# Patient Record
Sex: FEMALE | Race: WHITE
[De-identification: names, ages, dates, MRNs, and addresses within clinical notes are randomized per-mention and may not be internally consistent; named-entity substitution may affect disease eponyms.]

---

## 2017-01-11 ENCOUNTER — HOSPITAL ENCOUNTER (OUTPATIENT)
Dept: HOSPITAL 80 - FIMAGING | Age: 52
End: 2017-01-11
Payer: COMMERCIAL

## 2017-01-11 DIAGNOSIS — N63: Primary | ICD-10-CM

## 2017-01-11 PROCEDURE — 0HBU3ZX EXCISION OF LEFT BREAST, PERCUTANEOUS APPROACH, DIAGNOSTIC: ICD-10-PCS | Performed by: RADIOLOGY

## 2017-01-11 PROCEDURE — G0206 DX MAMMO INCL CAD UNI: HCPCS

## 2017-01-11 NOTE — US
Ultrasound-Guided Vacuum-Assisted Core Biopsy of the Left Breast     January 11, 2017

 

Indication:  1.2-cm hypoechoic nodule lower left breast 6 o'clock position.

 

Crosscutting Measure #226:  Current tobacco user:  No. 

 

Consent:  The procedure, risks, and benefits were discussed with the patient.  She agreed to proceed 
and signed the consent form.

 

Technique:  The 1.2-cm nodule at the 6 o'clock position was localized with a preprocedure ultrasound.
  The skin was marked, cleaned with ChloraPrep, and sterilely draped.  The skin and soft tissues were
 anesthetized with 1% lidocaine and bupivacaine.  Through a 2-mm skin nick, a 12-gauge Suros needle w
as advanced along the undersurface of the lesion.  Ten vacuum-assisted core biopsy samples were obtai
gabriela.  A visible nodule persisted at the end of the procedure.  A SecurMark clip was deployed, the nee
dle was removed, and pressure was applied to the biopsy site for 10 minutes.  No significant bleeding
 or complication was incurred.

 

Impression:  Successful ultrasound-guided core biopsy of nodule lower left breast 6 o'clock position.


 

Plan:  Postbiopsy mammograms.

## 2017-01-11 NOTE — MA
Left Diagnostic Mammogram  

 

Indication: Post biopsy clip deployment.

 

Technique: True lateral and CC views of the left breast.

 

Comparison: Left breast ultrasound, December 21, 2016, and tomosynthesis views, September 2016.

 

Findings: The clip is deployed at the 6 o'clock position just inferior to the nipple. No hematoma or 
discrete mass. Injected lidocaine obscures the soft tissue planes.

 

Impression: Good clip deployment at the 6 o'clock position.

 

Plan: Pending histologic results.

## 2017-01-30 ENCOUNTER — HOSPITAL ENCOUNTER (OUTPATIENT)
Dept: HOSPITAL 80 - FIMAGING | Age: 52
End: 2017-01-30
Attending: INTERNAL MEDICINE
Payer: COMMERCIAL

## 2017-01-30 DIAGNOSIS — C50.312: Primary | ICD-10-CM

## 2017-01-30 PROCEDURE — 0159T: CPT

## 2017-01-30 PROCEDURE — C8908 MRI W/O FOL W/CONT, BREAST,: HCPCS

## 2017-01-30 PROCEDURE — 77059: CPT

## 2017-01-30 PROCEDURE — A9585 GADOBUTROL INJECTION: HCPCS

## 2017-01-30 NOTE — MR
MRI Breasts Bilateral Without and With Contrast



History: Recently diagnosed left breast cancer with ultrasound biopsy. Staging.



Comparison: Ultrasound January 11, 2017, December 21, 2016, and mammography September 2016.



Technique:  Precontrast sagittal fat-saturated T2-weighted and Vibrant images of both breasts are obt
ained.  Subsequently, during intravenous administration of 5 mL Gadavist, multiphasic sagittally acqu
ired Vibrant MR images through both breasts are obtained.  In addition, high resolution axial images 
were obtained in the axial plane pre- and post contrast.  Data is sent to [facility] for additional anal
ysis including 3D reconstruction, computer-aided detection (CAD), and color-coded phase contrast enha
ncement evaluation.



Findings: There is a spiculated mass in the inferior left breast in the 6 o'clock position measuring 
14 x 11 mm. There are mixed kinetics within this lesion. An adjacent small satellite nodule is seen m
easuring 4 mm which is 5 mm from the spiculated mass. Mild underlying fibrocystic enhancement in the 
left breast. Lymph node is seen in the left axilla measuring 18 mm with thickened cortex. No suspicio
us internal mammary lymph nodes.



Right breast: There is mild fibrocystic enhancement in the right breast. No evidence for mass or abno
rmal enhancement in the right breast to indicate contralateral breast carcinoma. No suspicious axilla
ry or internal mammary lymph nodes.



Impression: 

1. 14 mm spiculated mass inferior left breast in 6 o'clock position corresponding with the infiltrati
ng ductal carcinoma from recent ultrasound-guided biopsy. Small adjacent satellite nodule. Mildly fred
picious left axillary lymph node.

2. No evidence for contralateral breast carcinoma right breast.



BI-RADS: 6

## 2017-02-03 ENCOUNTER — HOSPITAL ENCOUNTER (OUTPATIENT)
Dept: HOSPITAL 80 - FSGY | Age: 52
Setting detail: OBSERVATION
LOS: 1 days | Discharge: HOME | End: 2017-02-04
Attending: SURGERY | Admitting: SURGERY
Payer: COMMERCIAL

## 2017-02-03 VITALS — RESPIRATION RATE: 16 BRPM

## 2017-02-03 DIAGNOSIS — C50.512: Primary | ICD-10-CM

## 2017-02-03 DIAGNOSIS — C77.9: ICD-10-CM

## 2017-02-03 DIAGNOSIS — Z17.0: ICD-10-CM

## 2017-02-03 PROCEDURE — 07B60ZX EXCISION OF LEFT AXILLARY LYMPHATIC, OPEN APPROACH, DIAGNOSTIC: ICD-10-PCS | Performed by: SURGERY

## 2017-02-03 PROCEDURE — C9728 PLACE DEVICE/MARKER, NON PRO: HCPCS

## 2017-02-03 PROCEDURE — 76098 X-RAY EXAM SURGICAL SPECIMEN: CPT

## 2017-02-03 PROCEDURE — A9520 TC99 TILMANOCEPT DIAG 0.5MCI: HCPCS

## 2017-02-03 PROCEDURE — 0HBU0ZZ EXCISION OF LEFT BREAST, OPEN APPROACH: ICD-10-PCS | Performed by: SURGERY

## 2017-02-03 PROCEDURE — 0HX5XZZ TRANSFER CHEST SKIN, EXTERNAL APPROACH: ICD-10-PCS | Performed by: SURGERY

## 2017-02-03 PROCEDURE — 0HHU01Z INSERTION OF RADIOACTIVE ELEMENT INTO LEFT BREAST, OPEN APPROACH: ICD-10-PCS | Performed by: SURGERY

## 2017-02-03 PROCEDURE — 14000 TIS TRNFR TRUNK 10 SQ CM/<: CPT

## 2017-02-03 PROCEDURE — G0378 HOSPITAL OBSERVATION PER HR: HCPCS

## 2017-02-03 PROCEDURE — 19285 PERQ DEV BREAST 1ST US IMAG: CPT

## 2017-02-03 PROCEDURE — 19302 P-MASTECTOMY W/LN REMOVAL: CPT

## 2017-02-03 PROCEDURE — 3E0W3HZ INTRODUCTION OF RADIOACTIVE SUBSTANCE INTO LYMPHATICS, PERCUTANEOUS APPROACH: ICD-10-PCS | Performed by: SURGERY

## 2017-02-03 PROCEDURE — 78195 LYMPH SYSTEM IMAGING: CPT

## 2017-02-03 RX ADMIN — HYDROCODONE BITARTRATE AND ACETAMINOPHEN PRN TAB: 5; 325 TABLET ORAL at 23:10

## 2017-02-03 RX ADMIN — HYDROCODONE BITARTRATE AND ACETAMINOPHEN PRN TAB: 5; 325 TABLET ORAL at 17:42

## 2017-02-03 NOTE — GOP
[f 
rep st]



                                                                OPERATIVE REPORT





DATE OF OPERATION:  02/03/2017



SURGEON:  Ryder Rondon MD, FACS



ASSISTANT:  GLORIA Burris



ANESTHESIA:  General by laryngeal mask, Mervin Wood MD.



PREOPERATIVE DIAGNOSIS:  Left breast carcinoma.



POSTOPERATIVE DIAGNOSIS:  Left breast carcinoma.



PROCEDURE PERFORMED:  

1.  Left partial mastectomy with preoperative ultrasound-guided needle 
localization, left axillary sentinel lymph node mapping and biopsy.

2.  Left axillary lymph node dissection.

3.  Left breast local tissue flap reconstruction.

4.  Left breast implantation of absorbable marker (BioZorb. [facility] code ).



FINDINGS:  Two sentinel lymph nodes submitted for frozen section.  One of the 2 
showing evidence of metastatic malignancy.  Subsequent level 1 axillary lymph 
node dissection submitted for permanent section.  Left partial mastectomy 
specimen inked with a margin marker kit and submitted to imaging for specimen 
mammogram, confirming the tumor and previously deployed Suros clip within the 
specimen.  Subsequently submitted to Pathology for permanent section.



ESTIMATED BLOOD LOSS:  50 mL.



DESCRIPTION OF PROCEDURE:  After informed consent was obtained, the patient was 
brought to the operating room, placed under general anesthesia.  She had 
undergone preoperative sentinel lymph node injection and lymphoscintigraphy, 
revealing a left axillary node as the primary point of drainage from the left 
breast.  She also underwent ultrasound-guided needle localization of the 
previously biopsied tumor in the lower central left breast.  Before proceeding, 
a time-out and identification of the patient was performed. 



The left breast and chest wall were prepped and draped in the usual fashion.  
The Neoprobe gamma detector was used to identify the sentinel node in the axilla
, and the skin marked on the surface with a marking pen.  The skin was 
infiltrated with 0.25% Marcaine plain, incised transversely, and dissection 
carried out through the skin and subcutaneous tissues, and superficial axillary 
fascia. The deep axillary tissues were dissected and 3 nodes were removed.  Two 
of these were sentinel nodes with ex vivo counts of greater than 1000.  The 3rd 
lymph node was a non sentinel lymph node and was set aside for permanent 
section.  These were submitted for frozen section.  One of them felt somewhat 
firm, though neither 1 was particularly enlarged. 



While we were waiting for the results of the sentinel node biopsy, a partial 
mastectomy was performed as follows.  The patient's MRI was brought up on 
imaging in the operating room and studied, with the wire placed firmly through 
the center of the mass on ultrasound.  A circumareolar incision was planned.  
The area was infiltrated with 0.25% Marcaine, incised between approximately the 
7 o'clock and 4 o'clock position of the left areola, and dissection carried out 
through skin and subcutaneous tissues.  The wire which entered the breast in 
the lower outer quadrant was directed cephalad and medially, was intercepted 
into the incision.  The breast tissue around the shaft and tip of the wire was 
widely excised, and the specimen removed from the field.  Hemostasis was 
secured with cautery and 3-0 Monocryl suture ligature.  The specimen was 
immediately inked with a margin marker kit, and submitted for specimen mammogram
, and subsequent permanent section.  The cavity was reconstructed using 
oncoplastic techniques by freeing up adjacent tissue in 3 different orientations
, cephalad, medially, and laterally.  There was very little tissue inferiorly.  
The inframammary fold was released to allow attachment of the BioZorb device to 
the lower edge of the cavity.  The breast tissue deep to the subareolar plane 
was incised with cautery approximately 1.5 to 2 cm deep to the surface, and a 
cantilever of tissue created extending to the supraareolar region.  This was 
extended medially as well as laterally into the subcutaneous fatty tissues.  
The cavity was sized and a 2 x 3 x 1 cm low-profile BioZorb device was brought 
onto the field, and this was sutured to the surrounding breast tissue and the 
inframammary fold, bringing the cantilever of cephalad tissue over the top of 
the device, to provide tissue coverage.  This resulted in an acceptable contour 
of the breast, with no significant defect noted from the surface.  The 
remainder of the subcutaneous tissues were closed with 3-0 Monocryl suture.  
Subcutaneous tissues were ultimately closed with 4-0 Monocryl suture in a 
subcuticular fashion.  



Subsequently the sentinel nodes were evaluated by Pathology and 1 of the 2 
nodes was positive by frozen section.  We proceeded with axillary lymph node 
dissection as we had discussed with the patient preoperatively.  The incision 
was extended approximately 1 cm posteriorly, and this allowed dissection of the 
axillary contents inferiorly from the axillary vein, preserving the 
thoracodorsal neurovascular bundle as well as long thoracic nerve of Capps.  
There were no grossly suspicious nodes within the specimen, and as it was 
detached from the tail of the breast, hemostasis was secured with cautery and 
hemoclips.  Specimen was removed from the field and submitted for permanent 
section.  



The cavity was irrigated and aspirated, and hemostasis appeared secure.  A 10 
mm flat Markell-Lantigua drain was brought through a separate stab wound, cut to 
length, and positioned into the axillary cavity.  The subcutaneous tissues were 
closed with 3-0 Monocryl suture.  Skin was closed with 4-0 Monocryl suture in a 
subcuticular fashion.  Mastisol and Steri-Strips were applied.  Needle, sponge, 
and instrument counts were correct.



COMPLICATIONS:  None.





Job #:  [id number]

MTDD

## 2017-02-03 NOTE — POSTOPPROG
Post Op Note


Date of Operation: 02/03/17


Surgeon: Ryder Rondon (MD, FACS)


Assistant: Louise Tavera RN-FA


Anesthesiologist: Mervin Wood MD


Anesthesia: LMA


Pre-op Diagnosis: left breast cancer


Post-op Diagnosis: same


Procedure: left partial mastectomy/ALND + sentinel node mapping + oncoplastic 

recon


Inf/Abcess present in the surg proc area at time of surgery?: No


EBL: 


Drains: Markell Lantigua


Specimen(s): 


left partial mastectomy


sentinel nodes x 2 (1/2 + on frozen section)


one non-sentinel node


level I ALND

## 2017-02-03 NOTE — SOAPPROG
Downtime Inpatient MD


Late Entry SOAP Note: 


Due to computer downtime, I am recreating the following medical record entry as 

of this date and time based on the information specified below: 





Information on which this medical record entry is based: 





(MD: Please list items, such as nursing notes, labs, imaging, etcetera)

## 2017-02-03 NOTE — NM
Left Breast Lymphoscintigraphy/[place] Node



History: Right breast carcinoma. Preop for elective left mastectomy.



Comparison Studies: Previous mammograms



Witnessed Consent:  Witnessed informed consent was obtained after the risks, benefits, and alternativ
es of lymphoscintigraphy/sentinel node were explained to the patient and all questions were answered.




Technique: Left breast was prepped with ChloraPrep solution. Utilizing sterile technique, a total of 
343 microcuries of technetium 99m Lymphoseek was injected with single periareolar subcutaneous inject
ion and a second retroareolar injection 245 microcuries of technetium 99m Lymphoseek. Patient tolerat
ed the procedure well without immediate complications.



Imaging: [place] node was localized in the anterior and lateral projections. Left axillary sentinel 
node identified.



Impression: Left breast sentinel node localization with Lymphoseek.

 



Crosscutting Measure #226: Current tobacco user:  no.

## 2017-02-03 NOTE — US
Ultrasound-guided left Breast Hookwire Needle Localization



History: Left-sided breast cancer.



Crosscutting Measure #226 : Current tobacco user  no.



Witnessed Consent: Witnessed informed consent was obtained after the risks, benefits, and alternative
s of ultrasound guided left breast hookwire needle localization were explained to the patient and all
 questions were answered.



Procedure: Utilizing sterile technique and mammographic guidance, the left breast mass was identified
 for hookwire needle localization. Skin was prepped with ChloraPrep solution. Lidocaine with bicarbon
ate were used as local anesthesia. Via a inferolateral approach, a 5 cm hookwire needle was advanced 
through the mass. Confirmation of good positioning of the hook wire was obtained with ultrasound. The
 wire was deployed. Sterile dressing was placed. Patient tolerated the procedure well without immedia
te complications. The patient underwent subsequent lymphoscintigraphy procedure. 



Impression:  Successful ultrasound-guided hookwire needle localization of left breast mass.

## 2017-02-03 NOTE — SOAPPROG
Downtime Inpatient MD


Late Entry SOAP Note: 


post op visit


Marcelle is awake and alert/visiting with her family


Her surgical site appears uncomplicated


DOTTIE is sanguinous with minimal output





We discussed findings at surgery and post op recovery


I anticipate she will be able to discharge in the morning.





JAMIL Rondon MD, FACS

## 2017-02-03 NOTE — MA
Specimen radiograph 1107  hours.



History: Mass left breast.



Findings: The specimen radiograph contains the needle as well as the mass and clip that were localize
d earlier today. These results were called to the OR.



Impression: The mass and clip are present within the specimen radiograph submitted.

## 2017-02-04 VITALS — TEMPERATURE: 98.6 F | HEART RATE: 82 BPM | DIASTOLIC BLOOD PRESSURE: 51 MMHG | SYSTOLIC BLOOD PRESSURE: 95 MMHG

## 2017-02-04 VITALS — OXYGEN SATURATION: 95 %

## 2017-02-04 NOTE — PDDCSUM
Discharge Summary


Discharge Summary: 


DOA: 2/3/17


DOD: 2/4/17





DC Dx: Left breast cancer, stage II





Procedures: 2/3/17 Left partial mastectomy/ALND





Course: Marcelle was admitted for left partial mastectomy/sentinel lymph node 

mapping and biopsy.  She was found to have one of two sentinel nodes positive


during surgery and underwent completion ALND.  She was admitted for post op 

pain control and observation.  On the morning after surgery she was 

hemodynamically stable and her surgical site appeared uncomplicated.  She was 

advanced in her diet and instructed in wound/drain care. She will follow up in 

my office this coming week and I will call her with pathology results when they 

are available





DC meds:


Norco 5/325 #30


Ativan 1mg #20





FU with Dr. Chavez after pathology results available to discuss options for 

adjuvant therapy.

## 2017-09-15 ENCOUNTER — HOSPITAL ENCOUNTER (OUTPATIENT)
Dept: HOSPITAL 80 - FIMAGING | Age: 52
End: 2017-09-15
Attending: NURSE PRACTITIONER
Payer: COMMERCIAL

## 2017-09-15 DIAGNOSIS — M81.0: ICD-10-CM

## 2017-09-15 DIAGNOSIS — Z13.820: Primary | ICD-10-CM

## 2018-01-11 ENCOUNTER — HOSPITAL ENCOUNTER (OUTPATIENT)
Dept: HOSPITAL 80 - FIMAGING | Age: 53
End: 2018-01-11
Attending: INTERNAL MEDICINE
Payer: COMMERCIAL

## 2018-01-11 DIAGNOSIS — Z12.31: Primary | ICD-10-CM

## 2018-02-20 ENCOUNTER — HOSPITAL ENCOUNTER (EMERGENCY)
Dept: HOSPITAL 80 - FED | Age: 53
Discharge: HOME | End: 2018-02-20
Payer: COMMERCIAL

## 2018-02-20 VITALS — TEMPERATURE: 97.7 F | HEART RATE: 72 BPM

## 2018-02-20 VITALS
SYSTOLIC BLOOD PRESSURE: 108 MMHG | OXYGEN SATURATION: 93 % | DIASTOLIC BLOOD PRESSURE: 79 MMHG | RESPIRATION RATE: 16 BRPM
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
## 2018-02-20 DIAGNOSIS — S42.202A: Primary | ICD-10-CM
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
## 2018-02-20 NOTE — EDPHY
H & P


Time Seen by Provider: 02/20/18 17:13


HPI/ROS: 





CHIEF COMPLAINT:  Left humerus pain





HISTORY OF PRESENT ILLNESS:  The patient is a 52-year-old female who was skiing 

and fell sideways landing on her left arm.  Her arm was not outstretched.  She 

has pain to her left humerus area.  No shoulder or clavicle pain.  No elbow or 

hand pain.  Normal sensation and movement distally.  She denies head neck or 

back pain.  She denies loss of consciousness or headache.  This happened about 

an hour ago.








REVIEW OF SYSTEMS:


Constitutional:  denies: chills, fever, recent illness, recent injury


EENTM: denies: blurred vision, double vision, nose congestion


Respiratory: denies: cough, shortness of breath


Cardiac: denies: chest pain, irregular heart rate, lightheadedness, palpitations


Gastrointestinal/Abdominal: denies: abdominal pain, diarrhea, nausea, vomiting, 

blood streaked stools


Genitourinary: denies: dysuria, frequency, hematuria, pain


Musculoskeletal:  See HPI


Skin: denies: lesions, rash, jaundice, bruising


Neurological: denies: headache, numbness, paresthesia, tingling, dizziness, 

weakness


Hematologic/Lymphatic: denies: blood clots, easy bleeding, easy bruising


Immunologic/allergic: denies: HIV/AIDS, transplant








EXAM:


GENERAL:  Well-appearing, well-nourished and in no acute distress.


HEAD:  Atraumatic, normocephalic.


EYES:  Pupils equal round and reactive to light, extraocular movements intact, 

sclera anicteric, conjunctiva are normal.


ENT:  TMs normal, nares patent, oropharynx clear without exudates.  Moist 

mucous membranes.


NECK:  Normal range of motion, supple without lymphadenopathy or JVD.


LUNGS:  Breath sounds clear to auscultation bilaterally and equal.  No wheezes 

rales or rhonchi.


HEART:  Regular rate and rhythm without murmurs, rubs or gallops.


ABDOMEN:  Soft, nontender, normoactive bowel sounds.  No guarding, no rebound.  

No masses appreciated.


BACK:  No CVA tenderness, no spinal tenderness, step-offs or deformities


EXTREMITIES:  No significant swelling.  Pain to left proximal humerus.  No 

obvious deformity.  Normal range of motion of elbow.  No tenderness to clavicle 

or AC joint.  Normal pulses and sensation distally.


NEUROLOGICAL:  Cranial nerves II through XII grossly intact.  Normal speech, 

normal gait.  5/5 strength, normal movement in all extremities, normal sensation


PSYCH:  Normal mood, normal affect.


SKIN:  Warm, dry, normal turgor, no visible rashes or lesions.








Source: Patient


Exam Limitations: No limitations





- Medical/Surgical History


Hx Asthma: No


Hx Chronic Respiratory Disease: No


Hx Diabetes: No


Hx Cardiac Disease: No


Hx Renal Disease: No


Hx Cirrhosis: No


Hx Alcoholism: No


Hx HIV/AIDS: No


Hx Splenectomy or Spleen Trauma: No





- Family History


Significant Family History: No pertinent family hx





- Social History


Smoking Status: Never smoked


Alcohol Use: Sober


Drug Use: None


Constitutional: 


 Initial Vital Signs











Temperature (C)  36.5 C   02/20/18 17:20


 


Heart Rate  72   02/20/18 17:20


 


Respiratory Rate  18   02/20/18 17:20


 


Blood Pressure  128/94 H  02/20/18 17:20


 


O2 Sat (%)  98   02/20/18 17:20








 











O2 Delivery Mode               Room Air














Allergies/Adverse Reactions: 


 





tetracycline Allergy (Verified 12/22/16 12:14)


 








Home Medications: 














 Medication  Instructions  Recorded


 


Hydrocodone/APAP 5/325 [[place] 1 - 2 each PO Q4 PRN #14 tab 02/20/18





5/325]  


 


Tamoxifen Citrate  02/20/18














Medical Decision Making





- Diagnostics


Imaging Results: 


 Imaging Impressions





Humerus X-Ray  02/20/18 17:21


Impression:  Nondisplaced fracture of the proximal humerus.


 











Imaging: Discussed imaging studies w/ On call Radiologist


Procedures: 





Procedure:  Splint placement.





A left arm sling was applied.  After application of the splint I returned and re

-examined the patient.  The splint was adequately immobilizing the joint and 

distal to the splint the patient's circulation and sensation was intact.


ED Course/Re-evaluation: 





6:15 p.m. we discussed the x-ray results as well as follow up with Orthopedics.

  The patient was placed in a sling.  She states Vicodin has worked well for 

her in the past.





6:30 p.m. I spoke with Dr. Braden who reviewed the images and will follow up in 

the patient in his office.


Differential Diagnosis: 





Partial list of the Differential diagnosis considered include but were not 

limited to;  proximal humerus fracture, AC separation and although unlikely 

based on the history and physical exam, I also considered dislocation, vascular 

injury, nerve injury, eye injury, neck injury.  I discussed these differential 

diagnoses and the plan with the patient as well as the usual and expected 

course.  The patient understands that the diagnosis is provisional and that in 

medicine we are not always correct and that further workup is often warranted.  

Usual and customary warnings were given.  All of the patient's questions were 

answered.  The patient was instructed to return to the emergency department 

should the symptoms at all worsen or return, otherwise to followup with the 

physician as we discussed.





- Data Points


Medications Given: 


 








Discontinued Medications





Hydrocodone Bitart/Acetaminophen (Norco 5/325mg Prepack#6)  1 btl TAKEHOME 

EDNOW ONE


   Stop: 02/20/18 18:21


   Last Admin: 02/20/18 18:32 Dose:  1 btl


Hydromorphone HCl (Dilaudid)  1 mg IM EDNOW ONE


   Stop: 02/20/18 17:22


   Last Admin: 02/20/18 17:32 Dose:  1 mg








Departure





- Departure


Disposition: Home, Routine, Self-Care


Clinical Impression: 


Fracture of proximal end of left humerus


Qualifiers:


 Encounter type: initial encounter Fracture type: closed Fracture morphology: 

unspecified fracture morphology Qualified Code(s): S42.202A - Unspecified 

fracture of upper end of left humerus, initial encounter for closed fracture





Condition: Good


Instructions:  Hydrocodone/Acetaminophen (By mouth), Proximal Humerus Fracture (

ED)


Referrals: 


BARBARA NORIEGA [Primary Care Provider] - As per Instructions


Cecilio Braden MD [Medical Doctor] - 2-3 days, call for appt.


Prescriptions: 


Hydrocodone/APAP 5/325 [Norco 5/325] 1 - 2 each PO Q4 PRN #14 tab


 PRN Reason: Pain, Mild

## 2019-01-14 ENCOUNTER — HOSPITAL ENCOUNTER (OUTPATIENT)
Dept: HOSPITAL 80 - FIMAGING | Age: 54
End: 2019-01-14
Attending: INTERNAL MEDICINE
Payer: COMMERCIAL

## 2019-01-14 DIAGNOSIS — Z85.3: ICD-10-CM

## 2019-01-14 DIAGNOSIS — Z12.31: Primary | ICD-10-CM
